# Patient Record
Sex: MALE | NOT HISPANIC OR LATINO | Employment: FULL TIME | URBAN - NONMETROPOLITAN AREA
[De-identification: names, ages, dates, MRNs, and addresses within clinical notes are randomized per-mention and may not be internally consistent; named-entity substitution may affect disease eponyms.]

---

## 2018-12-18 ENCOUNTER — OFFICE VISIT CONVERTED (OUTPATIENT)
Dept: FAMILY MEDICINE CLINIC | Age: 33
End: 2018-12-18
Attending: NURSE PRACTITIONER

## 2019-08-06 ENCOUNTER — OFFICE VISIT CONVERTED (OUTPATIENT)
Dept: FAMILY MEDICINE CLINIC | Age: 34
End: 2019-08-06
Attending: NURSE PRACTITIONER

## 2021-02-04 ENCOUNTER — OFFICE VISIT (OUTPATIENT)
Dept: FAMILY MEDICINE CLINIC | Facility: CLINIC | Age: 36
End: 2021-02-04

## 2021-02-04 VITALS
BODY MASS INDEX: 18.05 KG/M2 | HEIGHT: 75 IN | SYSTOLIC BLOOD PRESSURE: 110 MMHG | HEART RATE: 102 BPM | OXYGEN SATURATION: 98 % | WEIGHT: 145.2 LBS | RESPIRATION RATE: 17 BRPM | TEMPERATURE: 98 F | DIASTOLIC BLOOD PRESSURE: 70 MMHG

## 2021-02-04 DIAGNOSIS — R53.83 FATIGUE, UNSPECIFIED TYPE: ICD-10-CM

## 2021-02-04 DIAGNOSIS — Z13.220 SCREENING FOR HYPERLIPIDEMIA: Primary | ICD-10-CM

## 2021-02-04 DIAGNOSIS — H61.21 HEARING LOSS OF RIGHT EAR DUE TO CERUMEN IMPACTION: ICD-10-CM

## 2021-02-04 LAB
ALBUMIN SERPL-MCNC: 5.1 G/DL (ref 3.5–5.2)
ALBUMIN/GLOB SERPL: 2.2 G/DL
ALP SERPL-CCNC: 80 U/L (ref 39–117)
ALT SERPL-CCNC: 17 U/L (ref 1–41)
AST SERPL-CCNC: 20 U/L (ref 1–40)
BASOPHILS # BLD AUTO: 0.03 10*3/MM3 (ref 0–0.2)
BASOPHILS NFR BLD AUTO: 0.4 % (ref 0–1.5)
BILIRUB SERPL-MCNC: 0.5 MG/DL (ref 0–1.2)
BUN SERPL-MCNC: 9 MG/DL (ref 6–20)
BUN/CREAT SERPL: 9.9 (ref 7–25)
CALCIUM SERPL-MCNC: 9.8 MG/DL (ref 8.6–10.5)
CHLORIDE SERPL-SCNC: 99 MMOL/L (ref 98–107)
CHOLEST SERPL-MCNC: 128 MG/DL (ref 0–200)
CO2 SERPL-SCNC: 28.3 MMOL/L (ref 22–29)
CREAT SERPL-MCNC: 0.91 MG/DL (ref 0.76–1.27)
EOSINOPHIL # BLD AUTO: 0.02 10*3/MM3 (ref 0–0.4)
EOSINOPHIL NFR BLD AUTO: 0.2 % (ref 0.3–6.2)
ERYTHROCYTE [DISTWIDTH] IN BLOOD BY AUTOMATED COUNT: 12.7 % (ref 12.3–15.4)
GLOBULIN SER CALC-MCNC: 2.3 GM/DL
GLUCOSE SERPL-MCNC: 94 MG/DL (ref 65–99)
HCT VFR BLD AUTO: 43.9 % (ref 37.5–51)
HDLC SERPL-MCNC: 43 MG/DL (ref 40–60)
HGB BLD-MCNC: 14.7 G/DL (ref 13–17.7)
IMM GRANULOCYTES # BLD AUTO: 0.02 10*3/MM3 (ref 0–0.05)
IMM GRANULOCYTES NFR BLD AUTO: 0.2 % (ref 0–0.5)
LDLC SERPL CALC-MCNC: 73 MG/DL (ref 0–100)
LYMPHOCYTES # BLD AUTO: 1.27 10*3/MM3 (ref 0.7–3.1)
LYMPHOCYTES NFR BLD AUTO: 15.8 % (ref 19.6–45.3)
MCH RBC QN AUTO: 29.1 PG (ref 26.6–33)
MCHC RBC AUTO-ENTMCNC: 33.5 G/DL (ref 31.5–35.7)
MCV RBC AUTO: 86.9 FL (ref 79–97)
MONOCYTES # BLD AUTO: 1.06 10*3/MM3 (ref 0.1–0.9)
MONOCYTES NFR BLD AUTO: 13.2 % (ref 5–12)
NEUTROPHILS # BLD AUTO: 5.62 10*3/MM3 (ref 1.7–7)
NEUTROPHILS NFR BLD AUTO: 70.2 % (ref 42.7–76)
NRBC BLD AUTO-RTO: 0 /100 WBC (ref 0–0.2)
PLATELET # BLD AUTO: 251 10*3/MM3 (ref 140–450)
POTASSIUM SERPL-SCNC: 4.2 MMOL/L (ref 3.5–5.2)
PROT SERPL-MCNC: 7.4 G/DL (ref 6–8.5)
RBC # BLD AUTO: 5.05 10*6/MM3 (ref 4.14–5.8)
SODIUM SERPL-SCNC: 138 MMOL/L (ref 136–145)
TRIGL SERPL-MCNC: 51 MG/DL (ref 0–150)
VLDLC SERPL CALC-MCNC: 12 MG/DL (ref 5–40)
WBC # BLD AUTO: 8.02 10*3/MM3 (ref 3.4–10.8)

## 2021-02-04 PROCEDURE — 99385 PREV VISIT NEW AGE 18-39: CPT | Performed by: NURSE PRACTITIONER

## 2021-02-04 NOTE — PROGRESS NOTES
Subjective   Beck Dupree is a 35 y.o. male.     Chief Complaint   Patient presents with   • Establish Care   • Annual Exam        Vitals:    02/04/21 0920   BP: 110/70   Pulse: 102   Resp: 17   Temp: 98 °F (36.7 °C)   SpO2: 98%        History of Present Illness   This is a 35-year-old male patient here today for annual physical and to establish care.  Patient has a past medical history of migraine headaches in the past.  He denies any problems with that lately and says he manages them with ibuprofen.  He is feeling well.  He does report some decreased hearing in his right ear.  He states he has had a problem with wax buildup in the past.  He denies any pain.  He is afebrile today.    The following portions of the patient's history were reviewed and updated as appropriate: allergies, current medications, past family history, past medical history, past social history, past surgical history, and problem list.    Review of Systems   Constitutional: Negative for activity change, appetite change, chills, fatigue and fever.   HENT: Negative for congestion, ear pain and sore throat.    Respiratory: Negative for cough, chest tightness and shortness of breath.    Cardiovascular: Negative for chest pain and leg swelling.   Gastrointestinal: Negative for abdominal pain.   Endocrine: Negative for cold intolerance and heat intolerance.   Musculoskeletal: Negative for back pain and gait problem.   Neurological: Negative for dizziness.   Psychiatric/Behavioral: Negative for behavioral problems and depressed mood.       Objective   Physical Exam  Constitutional:       Appearance: Normal appearance.   HENT:      Head: Normocephalic.      Right Ear: Tympanic membrane normal. There is impacted cerumen.      Left Ear: Tympanic membrane normal.      Nose: Nose normal.      Mouth/Throat:      Mouth: Mucous membranes are moist.   Eyes:      Pupils: Pupils are equal, round, and reactive to light.   Neck:      Musculoskeletal: Normal range  of motion and neck supple.   Cardiovascular:      Rate and Rhythm: Normal rate and regular rhythm.      Pulses: Normal pulses.      Heart sounds: Normal heart sounds.   Pulmonary:      Effort: Pulmonary effort is normal.      Breath sounds: Normal breath sounds.   Abdominal:      General: Bowel sounds are normal. There is no distension.      Palpations: There is no mass.   Musculoskeletal:         General: No swelling or tenderness.      Right lower leg: No edema.      Left lower leg: No edema.   Skin:     General: Skin is warm and dry.   Neurological:      Mental Status: He is alert and oriented to person, place, and time.   Psychiatric:         Mood and Affect: Mood normal.         Behavior: Behavior normal.           Assessment/Plan   Problems Addressed this Visit        Cardiac and Vasculature    Screening for hyperlipidemia - Primary    Relevant Orders    Lipid panel       Symptoms and Signs    Fatigue    Relevant Orders    Comprehensive metabolic panel    CBC w MANUAL Differential      Other Visit Diagnoses     Hearing loss of right ear due to cerumen impaction        Relevant Orders    Ear Cerumen Removal      Diagnoses       Codes Comments    Screening for hyperlipidemia    -  Primary ICD-10-CM: Z13.220  ICD-9-CM: V77.91     Fatigue, unspecified type     ICD-10-CM: R53.83  ICD-9-CM: 780.79     Hearing loss of right ear due to cerumen impaction     ICD-10-CM: H61.21  ICD-9-CM: 389.8, 380.4           After irrigation to right ear his TM is visible and is normal.  I will check a CBC CMP lipid panel today.  Patient return to see me in 1 year for annual physical exam or sooner if needed.    I spent a total of 30 minutes with the patient today including face-to-face encounter, reviewing data in the system, coordination of care with the nursing staff as well as consultants, documentation and entering orders.

## 2021-05-18 NOTE — PROGRESS NOTES
Beck Dupree 1985     Office/Outpatient Visit    Visit Date: Tue, Dec 18, 2018 12:55 pm    Provider: Tennille Dial N.P. (Assistant: Rose Marie Jurado MA)    Location: [facility]        Electronically signed by Tennille Dial N.P. on  12/18/2018 06:45:12 PM                             SUBJECTIVE:        CC:     Eric is a 33 year old White male.  This is his first visit to the clinic.  Patient presents today for new patient establishment         HPI:         PHQ-9 Depression Screening: Completed form scanned and in chart; Total Score 0 Alcohol Consumption Screening: Completed form scanned and in chart; Total Score 0         In regard to the health checkup, his last physical exam was 1 year ago.  He has never had an ECG. He's had vision screening done in 1-2018.  He performs testicular self-exams regularly.  He is current with his Td.  He is not current with influenza immunization.      Smoking Status:  Nonsmoker     ROS:     CONSTITUTIONAL:  Negative for chills, fatigue, fever and weight change.      CARDIOVASCULAR:  Negative for chest pain, orthopnea, paroxysmal nocturnal dyspnea and pedal edema.      RESPIRATORY:  Negative for dyspnea and cough.      GASTROINTESTINAL:  Negative for abdominal pain, heartburn, constipation, diarrhea, and stool changes.      MUSCULOSKELETAL:  Negative for arthralgias, back pain, and myalgias.      NEUROLOGICAL:  Negative for dizziness, headaches, paresthesias, and weakness.      PSYCHIATRIC:  Negative for anxiety and depression.          [facility]/FM/SH:     Last Reviewed on 12/18/2018 01:28 PM by Tennille Dial    Past Medical History:             PAST MEDICAL HISTORY     UNREMARKABLE         Surgical History:         Vasectomy: 2012;     [place] age 22;         Family History:     Father: Coronary Artery Disease     Mother: Healthy     Brother(s): Healthy; 1 brother(s) total     Sister(s): 0 sister(s) total     Son(s): 1 son(s) total     Daughter(s): 1  daughter(s) total     Paternal Grandfather: Coronary Artery Disease     Paternal Grandmother: Alzheimer's Disease;  Type 2 Diabetes     Maternal Grandfather: Coronary Artery Disease; Stent     Maternal Grandmother: Shingles- Hospitalized         Social History:     Occupation: [employer];     Marital Status:      Children: 2 children     Hobbies/Recreation: he enjoys fishing, Horse back riding, and 4 allen riding;     Exercise: Primary form of exercise is Push ups.   Frequency is daily.          Tobacco/Alcohol/Supplements:     Last Reviewed on 12/18/2018 01:28 PM by Tennille Dial    Tobacco: He has never smoked.          Alcohol:  Does not drink alcohol and never has.          Substance Abuse History:     Last Reviewed on 12/18/2018 01:28 PM by Tennille Dial         Mental Health History:     Last Reviewed on 12/18/2018 01:28 PM by Tennille Dial        Communicable Diseases (eg STDs):     Last Reviewed on 12/18/2018 01:28 PM by Tennille Dial            Current Problems:     Last Reviewed on 12/18/2018 01:28 PM by Tennille Dial    Screening for depression         Immunizations:     None        Allergies:     Last Reviewed on 12/18/2018 01:28 PM by Tennille Dial        Current Medications:     Last Reviewed on 12/18/2018 01:28 PM by Tennille Dial    None        OBJECTIVE:        Vitals:         Current: 12/18/2018 1:01:27 PM    Ht:  6 ft, 2 in;  Wt: 145.4 lbs;  BMI: 18.7    T: 97.9 F (oral);  BP: 111/70 mm Hg (left arm, sitting);  P: 60 bpm (left arm (BP Cuff), sitting)        Exams:     PHYSICAL EXAM:     GENERAL: Vitals recorded well developed, well nourished;  well groomed;  no apparent distress;     EYES: lids and lacrimal system are normal in appearance; extraocular movements intact; conjunctiva and cornea are normal; PERRLA;     E/N/T:  normal EACs, TMs, nasal/oral mucosa, teeth, gingiva, and oropharynx;     NECK:  supple, full ROM; no thyromegaly; no  carotid bruits;     RESPIRATORY: normal respiratory rate and pattern with no distress; normal breath sounds with no rales, rhonchi, wheezes or rubs;     CARDIOVASCULAR: normal rate; rhythm is regular;  normal S1; normal S2; no systolic murmur; no cyanosis; no edema;     GASTROINTESTINAL: nontender, nondistended; no hepatosplenomegaly or masses; no bruits;     SKIN:  no significant rashes or lesions; no suspicious moles;     MUSCULOSKELETAL:  Normal range of motion, strength and tone;     NEUROLOGICAL:  cranial nerves, motor and sensory function, reflexes, gait and coordination are all intact;     PSYCHIATRIC:  appropriate affect and demeanor; normal speech pattern; grossly normal memory;         ASSESSMENT:           V79.0   Z13.89  Screening for depression              DDx:     V70.0   Z00.00  Health checkup              DDx:         ORDERS:         Lab Orders:       [id number]  [facility] CBC with 3 part diff  (Send-Out)         [id number]  [facility] Comp. Metabolic Panel  (Send-Out)         [id number]  [facility] TSH  (Send-Out)         [id number]  [facility] Lipid Panel  (Send-Out)                   PLAN:          Health checkup     LABORATORY:  Labs ordered to be performed today include CBC, Comprehensive metabolic panel, lipid panel, and TSH.      FOLLOW-UP:.   for Annual Checkup           Orders:       [id number]  [facility] CBC with 3 part diff  (Send-Out)         [id number]  [facility] Comp. Metabolic Panel  (Send-Out)         [id number]  [facility] TSH  (Send-Out)         [id number]  [facility] - [facility] Lipid Panel  (Send-Out)               Patient Recommendations:        For  Health checkup:                     APPOINTMENT INFORMATION:        Monday Tuesday Wednesday Thursday Friday Saturday Sunday            Time:___________________AM  PM   Date:_____________________             CHARGE CAPTURE:           Primary Diagnosis:     V79.0 Screening for depression            Z13.89    Encounter for screening for other disorder              Orders:           [id number]   Preventive medicine, new patient, age 18-39 years  (In-House)           V70.0 Health checkup            Z00.00    Encounter for general adult medical examination without abnormal findings

## 2021-05-18 NOTE — PROGRESS NOTES
Beck Dupree 1985     Office/Outpatient Visit    Visit Date: Tue, Aug 6, 2019 04:08 pm    Provider: Tennille Dial N.P. (Assistant: Rose Marie Jurado MA)    Location: [facility]        Electronically signed by Tennille Dial N.P. on  08/10/2019 09:08:29 AM                             SUBJECTIVE:        CC:     Eric is a 34 year old White male.  presents today due to complaints of wax build up in right ear         HPI:         PHQ-9 Depression Screening: Completed form scanned and in chart; Total Score 1 Alcohol Consumption Screening: Completed form scanned and in chart; Total Score 0     ROS:     CONSTITUTIONAL:  Negative for chills, fatigue, fever and weight change.      CARDIOVASCULAR:  Negative for chest pain, orthopnea, paroxysmal nocturnal dyspnea and pedal edema.      RESPIRATORY:  Negative for dyspnea and cough.      GASTROINTESTINAL:  Negative for abdominal pain, heartburn, constipation, diarrhea, and stool changes.      PSYCHIATRIC:  Negative for anxiety and depression.          PM/FM/:     Last Reviewed on 12/18/2018 01:28 PM by Tennille Dial    Past Medical History:             PAST MEDICAL HISTORY     UNREMARKABLE         Surgical History:         Vasectomy: 2012;     [place] age 22;         Family History:     Father: Coronary Artery Disease     Mother: Healthy     Brother(s): Healthy; 1 brother(s) total     Sister(s): 0 sister(s) total     Son(s): 1 son(s) total     Daughter(s): 1 daughter(s) total     Paternal Grandfather: Coronary Artery Disease     Paternal Grandmother: Alzheimer's Disease;  Type 2 Diabetes     Maternal Grandfather: Coronary Artery Disease; Stent     Maternal Grandmother: Shingles- Hospitalized         Social History:     Occupation: [employer];     Marital Status:      Children: 2 children     Hobbies/Recreation: he enjoys fishing, Horse back riding, and 4 allen riding;     Exercise: Primary form of exercise is Push ups.   Frequency  is daily.          Tobacco/Alcohol/Supplements:     Last Reviewed on 12/18/2018 01:28 PM by Tennille Dial    Tobacco: He has never smoked.          Alcohol:  Does not drink alcohol and never has.          Substance Abuse History:     Last Reviewed on 12/18/2018 01:28 PM by Tennille Dial         Mental Health History:     Last Reviewed on 12/18/2018 01:28 PM by Tennille Dial        Communicable Diseases (eg STDs):     Last Reviewed on 12/18/2018 01:28 PM by Tennille Dial            Current Problems:     Last Reviewed on 12/18/2018 01:28 PM by Tennille Dial    Screening for depression         Immunizations:     None        Allergies:     Last Reviewed on 12/18/2018 01:28 PM by Tennille Dial        Current Medications:     Last Reviewed on 12/18/2018 01:28 PM by Tennille Dial        OBJECTIVE:        Vitals:         Current: 8/6/2019 4:13:09 PM    Ht:  6 ft, 2 in;  Wt: 148.4 lbs;  BMI: 19.1    T: 97.8 F (oral);  BP: 116/79 mm Hg (left arm, sitting);  P: 58 bpm (left arm (BP Cuff), sitting);  sCr: 0.75 mg/dL;  GFR: 125.70        Exams:     PHYSICAL EXAM:     GENERAL: Vitals recorded well developed, well nourished;  well groomed;  no apparent distress;     EYES: lids and lacrimal system are normal in appearance; extraocular movements intact; conjunctiva and cornea are normal; PERRLA;     E/N/T:  normal EACs, TMs, nasal/oral mucosa, teeth, gingiva, and oropharynx;     NECK:  supple, full ROM; no thyromegaly; no carotid bruits;     RESPIRATORY: normal respiratory rate and pattern with no distress; normal breath sounds with no rales, rhonchi, wheezes or rubs;     CARDIOVASCULAR: normal rate; rhythm is regular;  normal S1; normal S2; no systolic murmur; no cyanosis; no edema;     GASTROINTESTINAL: nontender, nondistended; no hepatosplenomegaly or masses; no bruits;     SKIN:  no significant rashes or lesions; no suspicious moles;     MUSCULOSKELETAL:  Normal range of motion,  strength and tone;     NEUROLOGICAL:  cranial nerves, motor and sensory function, reflexes, gait and coordination are all intact;     PSYCHIATRIC:  appropriate affect and demeanor; normal speech pattern; grossly normal memory;         Procedures:     Ear pain         Cerumen/Wax removal: Cerumen impaction is noted in the right ear The degree of wax accumulation is minor in the right ear.  With minimal difficulty, using a syringe irrigation, the wax is removed.  Removed from ear was hard balls of wax.  The patient tolerated the procedure well.      There were no complications.  Performed by:and             ASSESSMENT:           V79.0   Z13.89  Screening for depression              DDx:     388.70   H92.01  Ear pain              DDx:         ORDERS:         Procedures Ordered:       [id number]  Removal of impacted cerumen right ear (NURSE)  (In-House)                   PLAN:          Ear pain           Orders:       [id number]  Removal of impacted cerumen right ear (NURSE)  (In-House)               CHARGE CAPTURE:           Primary Diagnosis:     V79.0 Screening for depression            Z13.89    Encounter for screening for other disorder    388.70 Ear pain            H92.01    Otalgia, right ear              Orders:          [id number]   Removal of impacted cerumen right ear (NURSE)  (In-House)               ADDENDUMS:      ____________________________________    Addendum: 08/12/2019 03:10 PM - Tennille Dial        HPI: Pt states R ear has been hurting and having trouble hearing out of it.     Exam: ENT: R EAC with wax builidup, blocking ear canal. Clear after irrigation.    Add code [id number]

## 2021-07-01 VITALS
WEIGHT: 145.4 LBS | BODY MASS INDEX: 18.66 KG/M2 | HEART RATE: 60 BPM | SYSTOLIC BLOOD PRESSURE: 111 MMHG | DIASTOLIC BLOOD PRESSURE: 70 MMHG | HEIGHT: 74 IN | TEMPERATURE: 97.9 F

## 2021-07-01 VITALS
HEART RATE: 58 BPM | BODY MASS INDEX: 19.04 KG/M2 | SYSTOLIC BLOOD PRESSURE: 116 MMHG | HEIGHT: 74 IN | TEMPERATURE: 97.8 F | DIASTOLIC BLOOD PRESSURE: 79 MMHG | WEIGHT: 148.4 LBS